# Patient Record
Sex: FEMALE | Race: BLACK OR AFRICAN AMERICAN | NOT HISPANIC OR LATINO | URBAN - METROPOLITAN AREA
[De-identification: names, ages, dates, MRNs, and addresses within clinical notes are randomized per-mention and may not be internally consistent; named-entity substitution may affect disease eponyms.]

---

## 2020-12-25 ENCOUNTER — OFFICE VISIT - HEALTHEAST (OUTPATIENT)
Dept: FAMILY MEDICINE | Facility: CLINIC | Age: 45
End: 2020-12-25

## 2020-12-25 DIAGNOSIS — Z60.3 LANGUAGE BARRIER: ICD-10-CM
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
## 2020-12-25 DIAGNOSIS — M54.2 NECK PAIN: ICD-10-CM

## 2020-12-25 DIAGNOSIS — M53.3 PAIN IN THE COCCYX: ICD-10-CM

## 2020-12-25 DIAGNOSIS — R07.89 CHEST WALL TENDERNESS: ICD-10-CM

## 2020-12-25 DIAGNOSIS — Z75.8 LANGUAGE BARRIER: ICD-10-CM

## 2020-12-25 DIAGNOSIS — S09.90XA INJURY OF HEAD, INITIAL ENCOUNTER: ICD-10-CM

## 2020-12-25 DIAGNOSIS — M54.50 LUMBAR SPINE PAIN: ICD-10-CM

## 2020-12-25 SDOH — SOCIAL STABILITY - SOCIAL INSECURITY: ACCULTURATION DIFFICULTY: Z60.3

## 2021-06-05 VITALS
WEIGHT: 187 LBS | HEART RATE: 71 BPM | RESPIRATION RATE: 16 BRPM | DIASTOLIC BLOOD PRESSURE: 70 MMHG | SYSTOLIC BLOOD PRESSURE: 105 MMHG | OXYGEN SATURATION: 100 %

## 2021-06-14 NOTE — PROGRESS NOTES
Chief Complaint   Patient presents with     Fall     12/24/20 has low back pain around to front moves up ribs and spine       ASSESSMENT & PLAN:   Diagnoses and all orders for this visit:    Fall, initial encounter    Injury of head, initial encounter    Neck pain    Pain in the coccyx    Lumbar spine pain    Language barrier    Chest wall tenderness        MDM:  Fall from a seated position yesterday with pain at the tailbone and L-spine, scalp pain at base of neck, neck pain, cannot clear C-spine's.  As patient would require CT to clear her neck, did recommend emergency room as we are nearly closed and did not have access to CT at this time.  Patient son will drive her to the emergency room.  Is stable for transport via private car.  Extensive evaluation and discussion with , greater than 30 minutes for multiple injuries.    Spoke to ED doctor at [facility] prior to patient departure.    Patient and/or caregiver understood and agreed to plan. Patient was stable for discharge.    SUBJECTIVE    HPI:  HPI  Alexandria Rios presents to the walk-in clinic with   Chief Complaint   Patient presents with     Fall     12/24/20 has low back pain around to front moves up ribs and spine     Fall backwards yesterday onto concrete.  Landed on tailbone first then head.      Pain more or less everything from the waist up.  Worst pain back of the head, neck and tailbone. Rt thumb pain.  Denies concussion sx. Other than head pain at point of impact.  No laceration/bleeding from scalp at the time.      Hx of DM II     Associated with: Also new chest pain and tenderness, new lower abd pain but no direct trauma to the chest nor abd.      Symptoms started: yesterday     See ROS for additional symptoms and/or pertinent negatives.     Due to language barrier, an  was present during the history-taking and subsequent discussion (and for part of the physical exam) with this patient.    History obtained from the  patient.    No past medical history on file.    There are no active non-hospital problems to display for this patient.      No family history on file.    Social History     Tobacco Use     Smoking status: Never Smoker     Smokeless tobacco: Never Used   Substance Use Topics     Alcohol use: Never     Frequency: Never     Binge frequency: Never       Review of Systems   Constitutional: Negative for fever.   HENT: Negative for congestion.    Respiratory: Negative for cough and shortness of breath.    Cardiovascular: Positive for chest pain.   Gastrointestinal: Negative for vomiting.   Genitourinary:        No loss of bowel or bladder control   Musculoskeletal: Positive for back pain, neck pain and neck stiffness.   Skin: Negative for wound.   Neurological: Positive for headaches. Negative for dizziness, syncope and light-headedness.        No LOC        OBJECTIVE    Vitals:    12/25/20 1616   BP: 105/70   Pulse: 71   Resp: 16   SpO2: 100%   Weight: 187 lb (84.8 kg)       Physical Exam  Constitutional:       General: She is not in acute distress.     Appearance: Normal appearance. She is well-developed. She is not ill-appearing.   HENT:      Right Ear: External ear normal.      Left Ear: External ear normal.   Eyes:      General:         Right eye: No discharge.         Left eye: No discharge.      Conjunctiva/sclera: Conjunctivae normal.      Pupils: Pupils are equal, round, and reactive to light.   Neck:      Musculoskeletal: Muscular tenderness (Turning head to right or left 45 degrees causes C-spine area pain) present. No neck rigidity.   Cardiovascular:      Pulses: Normal pulses.   Pulmonary:      Effort: Pulmonary effort is normal.      Breath sounds: Normal breath sounds.   Abdominal:      General: There is no distension.      Tenderness: There is abdominal tenderness (lower abd ). There is no guarding.   Musculoskeletal: Normal range of motion.         General: Tenderness (tailbone, l-spine, t-spine, c-spine.   C-spine pain with turning head 45 degrees. Chest tenderness midsternal) present. No deformity.   Skin:     General: Skin is warm and dry.      Capillary Refill: Capillary refill takes less than 2 seconds.      Findings: No bruising.      Comments: No scalp hematoma/evidence of abrasion/healing laceration    Neurological:      General: No focal deficit present.      Mental Status: She is alert and oriented to person, place, and time.      Coordination: Coordination normal.   Psychiatric:         Mood and Affect: Mood normal.         Behavior: Behavior normal.         Thought Content: Thought content normal.         Judgment: Judgment normal.         Labs:  No results found for this or any previous visit (from the past 240 hour(s)).      Radiology:        PATIENT INSTRUCTIONS:   There are no Patient Instructions on file for this visit.